# Patient Record
Sex: MALE | Race: OTHER | Employment: UNEMPLOYED | URBAN - METROPOLITAN AREA
[De-identification: names, ages, dates, MRNs, and addresses within clinical notes are randomized per-mention and may not be internally consistent; named-entity substitution may affect disease eponyms.]

---

## 2017-08-09 PROBLEM — N39.0 UTI DUE TO EXTENDED-SPECTRUM BETA LACTAMASE (ESBL) PRODUCING ESCHERICHIA COLI: Status: ACTIVE | Noted: 2017-01-20

## 2017-08-09 PROBLEM — B96.29 UTI DUE TO EXTENDED-SPECTRUM BETA LACTAMASE (ESBL) PRODUCING ESCHERICHIA COLI: Status: ACTIVE | Noted: 2017-01-20

## 2017-08-09 PROBLEM — A41.9 SEPSIS (HCC): Status: ACTIVE | Noted: 2017-01-17

## 2017-08-09 PROBLEM — N17.9 AKI (ACUTE KIDNEY INJURY) (HCC): Status: ACTIVE | Noted: 2017-01-17

## 2017-08-09 PROBLEM — Z87.728 HX OF SPINA BIFIDA: Status: ACTIVE | Noted: 2017-01-20

## 2017-08-09 PROBLEM — Z16.12 UTI DUE TO EXTENDED-SPECTRUM BETA LACTAMASE (ESBL) PRODUCING ESCHERICHIA COLI: Status: ACTIVE | Noted: 2017-01-20

## 2019-04-15 ENCOUNTER — HOSPITAL ENCOUNTER (OUTPATIENT)
Dept: PHYSICAL THERAPY | Age: 49
Discharge: HOME | End: 2019-04-15
Payer: MEDICARE

## 2019-04-15 PROCEDURE — 97535 SELF CARE MNGMENT TRAINING: CPT | Performed by: PHYSICAL THERAPIST

## 2019-04-15 PROCEDURE — 97161 PT EVAL LOW COMPLEX 20 MIN: CPT | Performed by: PHYSICAL THERAPIST

## 2019-04-15 NOTE — PROGRESS NOTES
[address] \[facility] [place] [facility]\"" [address] THERAPY   [address],[facility], 70 [facility] - Phone: [phone]  Fax: [phone] / [address] [facility]  Patient Name: Indiana Campbell : 1970   Medical   Diagnosis: L knee pain Treatment Diagnosis: Pain in left knee [M25.562]   Onset Date: x3 weeks     Referral Source: Sloane Meyers NP Start of Care [facility]): 4/15/2019   Prior Hospitalization: See medical history Provider #: [id number]   Prior Level of Function: Denied L knee pain   Comorbidities: Renal transplant (2018), hld, hypothyroid, Vitamin d deficiency, dizziness   Medications: Verified on Patient Summary List   The Plan of Care and following information is based on the information from the initial evaluation.   ===========================================================================================  Assessment / key information:  50 M arrives to clinic with c/o L lateral knee pain x 3 weeks. Per pt went to sit<>stand and felt a pop with immediate pain/swelling since. MRI confirms L lateral meniscus tear. Pt states that he is going for orthopedic consult 19 and is opting for surgery. He attended this visit as it was scheduled prior to mri and orthopod. Knee aorm 0-128 without pain, good volitional quad set without lag during SLR. ttp along lateral joint line with +crepitus. +andreas. Negotiates stairs with step to gait pattern with pain during descend. At this time pt is opting to have surgery and will defer PT.  Pt was given hep.   ===========================================================================================  Eval Complexity: History HIGH Complexity :3+ comorbidities / personal factors will impact the outcome/ POC ;  Examination  LOW Complexity : 1-2 Standardized tests and measures addressing body structure, function, activity limitation and / or participation in recreation ; Presentation LOW Complexity : Stable, uncomplicated ;  Decision Making MEDIUM Complexity : FOTO score of 26-74; Overall Complexity LOW   Problem List: pain affecting function, decrease strength, edema affecting function, impaired gait/ balance, decrease ADL/ functional abilitiies, decrease activity tolerance, decrease flexibility/ joint mobility and decrease transfer abilities   Frequency / Duration:   Patient to be seen  1  times per week for 1  treatments:  Patient / Caregiver education and instruction: self care, activity modification and exercises  Therapist Signature: Sandra Reynolds DPT [facility]  Date: 4/46/6209   Certification Period: 4/15/19-7/10/19 Time: 9:47 AM   ===========================================================================================  I certify that the above Physical Therapy Services are being furnished while the patient is under my care. I agree with the treatment plan and certify that this therapy is necessary. Physician Signature:        Date:       Time:     Please sign and return to [facility], [facility]. or you may fax the signed copy to [phone]. Thank you.

## 2019-04-15 NOTE — PROGRESS NOTES
[facility] PHYSICAL THERAPY - DAILY TREATMENT NOTE    Patient Name: Steve Dennis        Date: 4/15/2019  : 1970   yes Patient  Verified  Visit #:   1   of   1  Insurance: Payor: [place] MEDICARE / Plan: [place] MEDICARE PART A & B / Product Type: Medicare /      In time: 900 Out time: 930   Total Treatment Time: 30     Medicare/BCBS Time Tracking (below)   Total Timed Codes (min):  10 1:1 Treatment Time:  10     TREATMENT AREA =  Pain in left knee [M25.562]    SUBJECTIVE  Pain Level (on 0 to 10 scale):  4  / 10   Medication Changes/New allergies or changes in medical history, any new surgeries or procedures?    no  If yes, update Summary List   Subjective Functional Status/Changes:  []  No changes reported     See ie          OBJECTIVE      10 min Self Care: See below   Rationale:    increase ROM and increase strength to improve the patients ability to complete adls     Billed With/As:   [] TE   [] TA   [] Neuro   [x] Self Care Patient Education: [x] Review HEP    [] Progressed/Changed HEP based on:   [x] positioning   [] body mechanics   [x] transfers   [] heat/ice application    [] other:      Other Objective/Functional Measures:    SC: reviewed and demo hep without increase in pain, activity modification including avoiding squats/transfers and use of ice. See ie     Post Treatment Pain Level (on 0 to 10) scale:    10     ASSESSMENT  Assessment/Changes in Function:     See ie     []  See Progress Note/Recertification   Patient will continue to benefit from skilled PT services to see ie to attain remaining goals. Progress toward goals / Updated goals:    See ie     PLAN  []  Upgrade activities as tolerated no Continue plan of care   [x]  Discharge due to : Pending knee surgery   []  Other:      Therapist: Leticia Duran, PT    Date: 4/15/2019 Time: 9:46 AM     No future appointments.

## 2020-10-22 ENCOUNTER — HOSPITAL ENCOUNTER (OUTPATIENT)
Dept: PHYSICAL THERAPY | Age: 50
Discharge: HOME | End: 2020-10-22
Payer: MEDICARE

## 2020-10-22 PROCEDURE — 97110 THERAPEUTIC EXERCISES: CPT

## 2020-10-22 PROCEDURE — 97161 PT EVAL LOW COMPLEX 20 MIN: CPT

## 2020-10-22 NOTE — PROGRESS NOTES
PHYSICAL THERAPY - DAILY TREATMENT NOTE    Patient Name: Dalton Duty        Date: 10/22/2020  : 1970   YES Patient  Verified  Visit #:     Insurance: Payor: [place] MEDICARE / Plan: VA MEDICARE PART A & B / Product Type: Medicare /      In time: 1:59 Out time: 2:36   Total Treatment Time: 37     Medicare/BCBS Time Tracking (below)   Total Timed Codes (min):  17 1:1 Treatment Time:  17     TREATMENT AREA =  TS    SUBJECTIVE    Pain Level (on 0 to 10 scale):  4  / 10   Medication Changes/New allergies or changes in medical history, any new surgeries or procedures? NO    If yes, update Summary List   Subjective Functional Status/Changes:  []  No changes reported     See POC          OBJECTIVE    10 min Therapeutic Exercise:  [x]  See flow sheet   Rationale:      increase ROM, increase strength and reduce pain to improve the patients ability to sit prolonged     7 min Self Care: Reviewed diagnosis, prognosis, therapy progression   Rationale:    Improve understanding of injury and therapy to have realistic expectation of therapy to improve compliance/adherence and satisfaction    Billed With/As:   [x] TE   [] TA   [] Neuro   [x] Self Care Patient Education: [x] Review HEP    [] Progressed/Changed HEP based on:   [] positioning   [] body mechanics   [] transfers   [] heat/ice application    [] other:        Other Objective/Functional Measures:    Shown and performed HEP     Post Treatment Pain Level (on 0 to 10) scale:   4 / 10     ASSESSMENT  Assessment/Changes in Function:     See POC     []  See Progress Note/Recertification   Patient will continue to benefit from skilled PT services to modify and progress therapeutic interventions, address functional mobility deficits, address ROM deficits, address strength deficits, analyze and address soft tissue restrictions and analyze and cue movement patterns to attain goals per POC.    Progress toward goals / Updated goals:    See POC     PLAN  [x] Upgrade activities as tolerated YES Continue plan of care   []  Discharge due to :    []  Other:      Therapist: Adela Madrigal PT, DPT, OCS, CSCS    Date: 10/22/2020 Time: 2:02 PM

## 2020-10-22 NOTE — PROGRESS NOTES
[address], [facility] [[email or web address]] 201[facility], 70 [facility] - Phone: [phone]  Fax: [phone] OF [facility] / [address] [facility]  Patient Name: Chris Woodard : 1970   Medical   Diagnosis: CS, LS pain Treatment Diagnosis: Low back pain [M54.5]   Onset Date: approx 1 month     Referral Source: Beth Nickerson NP Start of Care [facility]): 10/22/2020   Prior Hospitalization: See medical history Provider #: [id number]   Prior Level of Function: Previous less mid back pain and no difficulty with sitting prolonged   Comorbidities: DM, arthritis, thyroid, spina bifida   Medications: Verified on Patient Summary List   The Plan of Care and following information is based on the information from the initial evaluation.   ===========================================================================================  Assessment / key information:  Chris Woodard is a 48 y.o.  yo male with Dx: TS pain, who reports approx 1 month ago having mid back pain increasing with sitting. Pt rates pain as 10/10 max, 4/10 at best, 4/10 today. Prior treatment includes meds recently with some relief. Pt also has neck and lower back pain at times. Objective: FOTO score = 40 (an established functional score where 100 = no disability). Pt amb into clinic with bilat LE braces and increased angle of gait  And bilat increased trunk lean with gait due to effects of spinabida. TS rotation in standing reduced bilat. In prone noted increase in TS kyphosis. Palpation shows tender at bilat TS paraspinals T4-8 whi  is where he located pain during sitting. C/S AROM: flex 40, ext 52, R lat flex 21, L lat flex 35, R rot 51, L rot 62. Laymond Quitter is reduced with some pain for resisted rhomboid and lower trap in prone. Negative CS compression.   Pt instructed in HEP and will f/u in clinic for PT.  ===========================================================================================  Eval Complexity: History HIGH Complexity :3+ comorbidities / personal factors will impact the outcome/ POC ;  Examination  HIGH Complexity : 4+ Standardized tests and measures addressing body structure, function, activity limitation and / or participation in recreation ; Presentation LOW Complexity : Stable, uncomplicated ;  Decision Making MEDIUM Complexity : FOTO score of 26-74; Overall Complexity LOW   Problem List: pain affecting function, decrease ROM, decrease strength, decrease ADL/ functional abilitiies, decrease activity tolerance and decrease flexibility/ joint mobility FOTO = 40  Treatment Plan may include any combination of the following: Therapeutic exercise, Therapeutic activities, Neuromuscular re-education, Physical agent/modality, Aquatic therapy, Patient education and Self Care training  Patient / Family readiness to learn indicated by: asking questions, trying to perform skills and interest  Persons(s) to be included in education: patient (P)  Barriers to Learning/Limitations: no  Measures taken, if barriers to learning: N/A   Patient Goal (s): \"make pain go away\", be able to sit without pain   Patient self reported health status: good  Rehabilitation Potential: good   Short Term Goals: To be accomplished in  1-2  weeks:  1. Independent with HEP. 2. Decrease max pain 25-50% to assist with sitting > 15 min.  Long Term Goals: To be accomplished in  4-6  weeks:  1. Decrease max pain 50-75% to assist with sitting > 30 min  2. Improve FOTO Functional Status Score by 18 points in order to show significant functional improvement. 3.  Improve lower trap strength to 4/5 without pain to show improved postural stabilty and to be able to sit for > 30 min without increased pain.   Frequency / Duration:   Patient to be seen  2-3  times per week for 4-6  weeks:  Patient / Caregiver education and instruction: self care and exercises  Therapist Signature: Jorge Lizarraga PT, DPT, OCS, CSCS Date: 19/20/1288   Certification Period: 10/22/20 - 1/19/21 Time: 2:02 PM   ===========================================================================================  I certify that the above Physical Therapy Services are being furnished while the patient is under my care. I agree with the treatment plan and certify that this therapy is necessary. Physician Signature:        Date:       Time:     Please sign and return to [facility] [place] or you may fax the signed copy to [phone]. Thank you.

## 2020-10-27 ENCOUNTER — HOSPITAL ENCOUNTER (OUTPATIENT)
Dept: PHYSICAL THERAPY | Age: 50
End: 2020-10-27
Payer: MEDICARE

## 2020-11-02 ENCOUNTER — HOSPITAL ENCOUNTER (OUTPATIENT)
Dept: PHYSICAL THERAPY | Age: 50
Discharge: HOME | End: 2020-11-02
Payer: MEDICARE

## 2020-11-02 PROCEDURE — 97140 MANUAL THERAPY 1/> REGIONS: CPT

## 2020-11-02 PROCEDURE — 97110 THERAPEUTIC EXERCISES: CPT

## 2020-11-02 NOTE — PROGRESS NOTES
PHYSICAL THERAPY - DAILY TREATMENT NOTE    Patient Name: Gabby Horn        Date: 2020  : 1970   yes Patient  Verified  Visit #:  2    of   18  Insurance: Payor: [place] MEDICARE / Plan: VA MEDICARE PART A & B / Product Type: Medicare /      In time: 175 Out time: 1000   Total Treatment Time: 45     Medicare/BCBS Time Tracking (below)   Total Timed Codes (min):  45 1:1 Treatment Time:  45     TREATMENT AREA =  Low back pain [M54.5]    SUBJECTIVE  Pain Level (on 0 to 10 scale):  0  / 10   Medication Changes/New allergies or changes in medical history, any new surgeries or procedures?    no  If yes, update Summary List   Subjective Functional Status/Changes:  []  No changes reported     Not having any pain today, but I had a lot of pain this past Friday. The pain comes and goes. OBJECTIVE    30 min Therapeutic Exercise:  [x]  See flow sheet   Rationale:      increase ROM, increase strength, improve coordination and improve balance to improve the patients ability to perform general ADLs with decrease c/o symptoms     15 min Manual Therapy: DTM T/S paraspinals (T4 - L2), T/S mobs   Rationale:      decrease pain, increase ROM and increase tissue extensibility to improve patient's ability to perform general ADLs with decrease c/o symptoms   The manual therapy interventions were performed at a separate and distinct time from the therapeutic activities interventions. Billed With/As:   [x] TE   [] TA   [] Neuro   [] Self Care Patient Education: [x] Review HEP    [] Progressed/Changed HEP based on:   [] positioning   [] body mechanics   [] transfers   [] heat/ice application    [] other:      Other Objective/Functional Measures:    No change in functional measurements today.      Post Treatment Pain Level (on 0 to 10) scale:   4  / 10     ASSESSMENT  Assessment/Changes in Function:     Overall good tolerance to all therapeutic interventions for first treatment session     []  See Progress Note/Recertification   Patient will continue to benefit from skilled PT services to modify and progress therapeutic interventions, address ROM deficits, address strength deficits, analyze and address soft tissue restrictions and analyze and cue movement patterns to attain remaining goals. Progress toward goals / Updated goals: · Short Term Goals: To be accomplished in  1-2  weeks:  1. Independent with HEP. 2. Decrease max pain 25-50% to assist with sitting > 15 min. · Long Term Goals: To be accomplished in  4-6  weeks:  1. Decrease max pain 50-75% to assist with sitting > 30 min  2. Improve FOTO Functional Status Score by 18 points in order to show significant functional improvement. 3.  Improve lower trap strength to 4/5 without pain to show improved postural stabilty and to be able to sit for > 30 min without increased pain. nochange toward goals today.      PLAN  []  Upgrade activities as tolerated yes Continue plan of care   []  Discharge due to :    []  Other:      Therapist: Ese Dior PTA    Date: 11/2/2020 Time: 6:25 AM     Future Appointments   Date Time Provider Howard Katz   11/2/2020  9:15 AM Cielo Briscoe, JUANITO [facility]   11/3/2020 11:50 AM [facility] NURSE [facility] Open[facility]   11/5/2020  4:00 PM Pierce Lopes, PT [facility]   11/10/2020 11:40 AM Bunny Wiseman NP [facility] OpenPM   11/12/2020  4:00 PM Reed Emerson, PT [facility]   11/16/2020  4:00 PM Pierce Lopes, PT [facility]   11/19/2020  4:00 PM Pierce Lopes, PT [facility]   11/30/2020  4:00 PM Pierce Lopes, PT [facility]

## 2020-11-05 ENCOUNTER — HOSPITAL ENCOUNTER (OUTPATIENT)
Dept: PHYSICAL THERAPY | Age: 50
Discharge: HOME | End: 2020-11-05
Payer: MEDICARE

## 2020-11-05 PROCEDURE — 97110 THERAPEUTIC EXERCISES: CPT

## 2020-11-05 PROCEDURE — 97140 MANUAL THERAPY 1/> REGIONS: CPT

## 2020-11-05 NOTE — PROGRESS NOTES
PHYSICAL THERAPY - DAILY TREATMENT NOTE    Patient Name: Christianne Mora        Date: 2020  : 1970   yes Patient  Verified  Visit #:   3   of   18  Insurance: Payor: Bimal Laws / Plan: VA MEDICARE PART A & B / Product Type: Medicare /      In time: 4:05 Out time: 4:32   Total Treatment Time: 27     Medicare/BCBS Time Tracking (below)   Total Timed Codes (min):  27 1:1 Treatment Time:  27     TREATMENT AREA =  Low back pain [M54.5]    SUBJECTIVE  Pain Level (on 0 to 10 scale):  2  / 10   Medication Changes/New allergies or changes in medical history, any new surgeries or procedures?    no  If yes, update Summary List   Subjective Functional Status/Changes:  []  No changes reported     No new c/o, no functional changes, indep with HEP          OBJECTIVE      17 min Therapeutic Exercise:  [x]  See flow sheet   Rationale:      increase ROM and increase strength to improve the patients ability to sit prolonged     10 min Manual Therapy: DTM TS paraspinals, TS PA mobs   Rationale:      decrease pain, increase ROM and increase tissue extensibility to improve patient's ability to sit prolonged  The manual therapy interventions were performed at a separate and distinct time from the therapeutic activities interventions.      Billed With/As:   [x] TE   [] TA   [] Neuro   [] Self Care Patient Education: [x] Review HEP    [] Progressed/Changed HEP based on:   [] positioning   [] body mechanics   [] transfers   [] heat/ice application    [] other:      Other Objective/Functional Measures:    Some c/o right shld discomfort with Ts/Ys, otherwise no c/o pain with therex  Reported feels good to stretch     Post Treatment Pain Level (on 0 to 10) scale:   0  / 10     ASSESSMENT  Assessment/Changes in Function:     Good nena to exs     []  See Progress Note/Recertification   Patient will continue to benefit from skilled PT services to modify and progress therapeutic interventions, address functional mobility deficits, address ROM deficits, address strength deficits, analyze and address soft tissue restrictions and analyze and cue movement patterns to attain remaining goals.    Progress toward goals / Updated goals:    indep with HEP     PLAN  [x]  Upgrade activities as tolerated yes Continue plan of care   []  Discharge due to :    []  Other:      Therapist: Sanya Nayak PT, DPT, OCS, CSCS    Date: 11/5/2020 Time: 4:19 PM     Future Appointments   Date Time Provider Howard Katz   11/10/2020 11:40 AM Angelina Urena NP [facility]   11/12/2020  4:00 PM Deidre Emerson, PT [facility] [address]   11/16/2020  4:00 PM Quinton Douglas, PT [facility] [address]   11/19/2020  4:00 PM Alcantarabjorn Douglas, PT [facility] [address]   11/30/2020  4:00 PM Alcantarabjorn Douglas, PT [facility] [address]

## 2020-11-12 ENCOUNTER — APPOINTMENT (OUTPATIENT)
Dept: PHYSICAL THERAPY | Age: 50
End: 2020-11-12
Payer: MEDICARE

## 2020-11-16 ENCOUNTER — APPOINTMENT (OUTPATIENT)
Dept: PHYSICAL THERAPY | Age: 50
End: 2020-11-16
Payer: MEDICARE

## 2020-11-19 ENCOUNTER — APPOINTMENT (OUTPATIENT)
Dept: PHYSICAL THERAPY | Age: 50
End: 2020-11-19
Payer: MEDICARE

## 2020-11-30 ENCOUNTER — APPOINTMENT (OUTPATIENT)
Dept: PHYSICAL THERAPY | Age: 50
End: 2020-11-30
Payer: MEDICARE

## 2021-01-25 NOTE — PROGRESS NOTES
[address] PHYSICAL THERAPY  [address] [place] [address],[facility], 70 [facility] - Phone: [phone]  Fax: [phone]    DISCHARGE NOTE  Patient Name: Hamilton Spears : 1970   Treatment/Medical Diagnosis: Low back pain [M54.5]   Referral Source: Darryl Douglass NP     Date of Initial Visit: 10/22/20 Attended Visits: 3 Missed Visits: 1       SUMMARY OF TREATMENT  Pt attended only initial evaluation and     2     follow-ups and then did not return. Therefore a formal reassessment of goals was not performed. RECOMMENDATIONS  Discontinue physical therapy due to patient not returning. If you have any questions/comments please contact us directly at [phone]. Thank you for allowing us to assist in the care of your patient.     Therapist Signature: Florina Soto PT, DPT, OCS, CSCS Date: 20     Time: 5:00pm

## 2021-02-10 PROBLEM — T82.868A THROMBOSIS OF ARTERIOVENOUS FISTULA (HCC): Status: ACTIVE | Noted: 2021-02-10

## 2021-02-10 PROBLEM — D84.9 IMMUNOSUPPRESSION (HCC): Status: ACTIVE | Noted: 2021-02-10

## 2021-08-03 PROBLEM — N52.9 ED (ERECTILE DYSFUNCTION): Status: RESOLVED | Noted: 2021-08-03 | Resolved: 2021-08-03
